# Patient Record
Sex: MALE | Race: ASIAN | NOT HISPANIC OR LATINO | URBAN - METROPOLITAN AREA
[De-identification: names, ages, dates, MRNs, and addresses within clinical notes are randomized per-mention and may not be internally consistent; named-entity substitution may affect disease eponyms.]

---

## 2017-05-23 ENCOUNTER — OFFICE VISIT - HEALTHEAST (OUTPATIENT)
Dept: FAMILY MEDICINE | Facility: CLINIC | Age: 42
End: 2017-05-23

## 2017-05-23 ENCOUNTER — RECORDS - HEALTHEAST (OUTPATIENT)
Dept: GENERAL RADIOLOGY | Facility: CLINIC | Age: 42
End: 2017-05-23

## 2017-05-23 DIAGNOSIS — R19.7 DIARRHEA: ICD-10-CM

## 2017-05-23 DIAGNOSIS — K59.00 CONSTIPATION: ICD-10-CM

## 2017-05-23 DIAGNOSIS — F17.200 TOBACCO DEPENDENCE: ICD-10-CM

## 2017-05-23 DIAGNOSIS — K21.9 ACID REFLUX: ICD-10-CM

## 2017-05-23 DIAGNOSIS — R19.7 DIARRHEA, UNSPECIFIED: ICD-10-CM

## 2017-05-23 ASSESSMENT — MIFFLIN-ST. JEOR: SCORE: 1605.31

## 2017-05-25 LAB
TTG IGA SER-ACNC: 0.2 U/ML
TTG IGG SER-ACNC: <0.6 U/ML

## 2017-05-30 ENCOUNTER — COMMUNICATION - HEALTHEAST (OUTPATIENT)
Dept: FAMILY MEDICINE | Facility: CLINIC | Age: 42
End: 2017-05-30

## 2017-05-30 DIAGNOSIS — R19.7 DIARRHEA: ICD-10-CM

## 2017-06-05 ENCOUNTER — RECORDS - HEALTHEAST (OUTPATIENT)
Dept: ADMINISTRATIVE | Facility: OTHER | Age: 42
End: 2017-06-05

## 2017-07-12 ENCOUNTER — RECORDS - HEALTHEAST (OUTPATIENT)
Dept: ADMINISTRATIVE | Facility: OTHER | Age: 42
End: 2017-07-12

## 2017-07-14 ENCOUNTER — RECORDS - HEALTHEAST (OUTPATIENT)
Dept: ADMINISTRATIVE | Facility: OTHER | Age: 42
End: 2017-07-14

## 2017-09-14 ENCOUNTER — COMMUNICATION - HEALTHEAST (OUTPATIENT)
Dept: FAMILY MEDICINE | Facility: CLINIC | Age: 42
End: 2017-09-14

## 2017-09-14 DIAGNOSIS — A04.8 HELICOBACTER PYLORI (H. PYLORI) INFECTION: ICD-10-CM

## 2017-09-14 DIAGNOSIS — K29.70 GASTRITIS: ICD-10-CM

## 2019-12-05 ENCOUNTER — OFFICE VISIT - HEALTHEAST (OUTPATIENT)
Dept: FAMILY MEDICINE | Facility: CLINIC | Age: 44
End: 2019-12-05

## 2019-12-05 DIAGNOSIS — L60.0 INGROWN TOENAIL: ICD-10-CM

## 2019-12-05 DIAGNOSIS — F17.200 TOBACCO DEPENDENCE SYNDROME: ICD-10-CM

## 2019-12-05 DIAGNOSIS — R40.0 DAYTIME SLEEPINESS: ICD-10-CM

## 2019-12-05 ASSESSMENT — MIFFLIN-ST. JEOR: SCORE: 1603.21

## 2019-12-23 ENCOUNTER — OFFICE VISIT - HEALTHEAST (OUTPATIENT)
Dept: FAMILY MEDICINE | Facility: CLINIC | Age: 44
End: 2019-12-23

## 2019-12-23 DIAGNOSIS — L60.0 INGROWING NAIL, LEFT GREAT TOE: ICD-10-CM

## 2019-12-23 DIAGNOSIS — K29.70 GASTRITIS: ICD-10-CM

## 2019-12-23 DIAGNOSIS — A04.8 HELICOBACTER PYLORI (H. PYLORI) INFECTION: ICD-10-CM

## 2019-12-23 RX ORDER — ACETAMINOPHEN 500 MG
1000 TABLET ORAL EVERY 6 HOURS PRN
Qty: 60 TABLET | Refills: 0 | Status: SHIPPED | OUTPATIENT
Start: 2019-12-23

## 2021-05-17 ENCOUNTER — RECORDS - HEALTHEAST (OUTPATIENT)
Dept: ADMINISTRATIVE | Facility: OTHER | Age: 46
End: 2021-05-17

## 2021-05-17 ASSESSMENT — MIFFLIN-ST. JEOR
SCORE: 1608.82
SCORE: 1585.23

## 2021-05-18 ENCOUNTER — COMMUNICATION - HEALTHEAST (OUTPATIENT)
Dept: SCHEDULING | Facility: CLINIC | Age: 46
End: 2021-05-18

## 2021-05-18 ENCOUNTER — SURGERY - HEALTHEAST (OUTPATIENT)
Dept: SURGERY | Facility: HOSPITAL | Age: 46
End: 2021-05-18

## 2021-05-18 ENCOUNTER — RECORDS - HEALTHEAST (OUTPATIENT)
Dept: ADMINISTRATIVE | Facility: OTHER | Age: 46
End: 2021-05-18

## 2021-05-18 ENCOUNTER — ANESTHESIA - HEALTHEAST (OUTPATIENT)
Dept: SURGERY | Facility: HOSPITAL | Age: 46
End: 2021-05-18

## 2021-05-18 ASSESSMENT — MIFFLIN-ST. JEOR: SCORE: 1581.15

## 2021-05-26 ENCOUNTER — RECORDS - HEALTHEAST (OUTPATIENT)
Dept: ADMINISTRATIVE | Facility: CLINIC | Age: 46
End: 2021-05-26

## 2021-05-27 VITALS — WEIGHT: 159.8 LBS | BODY MASS INDEX: 23.6 KG/M2 | BODY MASS INDEX: 24.37 KG/M2 | WEIGHT: 165 LBS

## 2021-05-27 VITALS — WEIGHT: 158.9 LBS | BODY MASS INDEX: 23.47 KG/M2

## 2021-05-27 VITALS — HEIGHT: 69 IN | HEIGHT: 69 IN

## 2021-05-30 ENCOUNTER — RECORDS - HEALTHEAST (OUTPATIENT)
Dept: ADMINISTRATIVE | Facility: CLINIC | Age: 46
End: 2021-05-30

## 2021-05-31 VITALS — HEIGHT: 69 IN | WEIGHT: 164 LBS | BODY MASS INDEX: 24.29 KG/M2

## 2021-06-04 VITALS
BODY MASS INDEX: 24.35 KG/M2 | SYSTOLIC BLOOD PRESSURE: 112 MMHG | HEIGHT: 69 IN | DIASTOLIC BLOOD PRESSURE: 76 MMHG | RESPIRATION RATE: 16 BRPM | HEART RATE: 72 BPM | WEIGHT: 164.4 LBS

## 2021-06-04 VITALS
WEIGHT: 164 LBS | HEART RATE: 88 BPM | DIASTOLIC BLOOD PRESSURE: 62 MMHG | SYSTOLIC BLOOD PRESSURE: 106 MMHG | BODY MASS INDEX: 24.57 KG/M2

## 2021-06-04 NOTE — PROGRESS NOTES
"Subjective:  44 y.o. male with concerns of left great toe pain.  About 1 year ago stubbed toe and lost nail.  Feels it did not grow back correctly.  Very sensitive area on the medial margin.  No erythema or fluctuance.  He request to have the lateral nail border removed.    Patient also experiencing difficulty with fatigue.  Feels energy and motivation are low.  Does not feel depressed.  Sex drive is low.  Feels that he could sleep 20 hours/day.  Does not feel restored after sleep.  His wife does note that he snores.  Unsure of breathing pauses.  Will frequently sleep from 8 or 9 PM until 6 AM.  Sometimes home from work for a nap between 3 and 6 PM.  Not falling asleep at stop lights.    Patient also requests help in quitting smoking.  Does not want to use gum or patches.  Wants a pill.    Outpatient Medications Prior to Visit   Medication Sig Dispense Refill     omeprazole (PRILOSEC) 40 MG capsule TAKE 1 CAPSULE(40 MG) BY MOUTH DAILY 90 capsule 1     No facility-administered medications prior to visit.       Social History     Tobacco Use   Smoking Status Current Every Day Smoker     Packs/day: 1.00     Years: 30.00     Pack years: 30.00     Types: Cigarettes   Smokeless Tobacco Current User   Tobacco Comment    1 pack per day.       Objective:  /76 (Patient Site: Right Arm, Patient Position: Sitting, Cuff Size: Adult Regular)   Pulse 72   Resp 16   Ht 5' 8.5\" (1.74 m)   Wt 164 lb 6.4 oz (74.6 kg)   BMI 24.63 kg/m    GENERAL: alert, not distressed  CHEST: clear, no rales, rhonchi, or wheezes  CARDIAC: regular without murmur, gallop, or rub  ABDOMEN: soft, non tender, non distended, normal bowel sounds  MS: Left great toe examined.  Does appear to have some incurvated medial border.  No erythema.  No edema.  No sign of fluctuance.    Assessment and Plan:   1. Daytime sleepiness  History fairly concerning for sleep apnea.  Discussed care at moments of importance such as driving.  Would check testosterone " and thyroid tests as well as CBC.  He wishes to do those upon return to clinic.  - Ambulatory referral to Sleep Medicine    2. Ingrown toenail  Discussed management options.  He requested nail matrix removal.  He wishes to reschedule for that.  An appointment was made.  Risks, benefits, and alternatives to nail matrix removal were explored.    3. Tobacco dependence syndrome  Risks, benefits, alternatives, and possible side effects of Chantix therapy discussed.  Follow package instructions and pick a quit date after up to a full dose.  He understands to discontinue if there are untoward side effects.  - varenicline (CHANTIX STARTING MONTH BOX) 0.5 mg (11)- 1 mg (42) tablet; 1 wk before you stop smoking take 0.5mg daily on days 1-3, 0.5mg 2 times each day on days 4-7, then 1mg 2 times daily.  Dispense: 53 tablet; Refill: 0  - varenicline (CHANTIX) 1 mg tablet; Take 1 tab by mouth two times a day. Take after eating with a full glass of water. NOTE: Dispense as maintenance for refills only.  Dispense: 60 tablet; Refill: 2

## 2021-06-04 NOTE — PROGRESS NOTES
Patient with hx of gastritis from H pyloir.  PPI helps. Has been treated for H pylori .  He requested a refill of PPI which was provided.  Avoid NSAIDs for pain (toe nail procedure today), so acetaminophen rx sent.

## 2021-06-04 NOTE — PROGRESS NOTES
Procedures   Partial Nail Avulsion   Medial Left great toe    Risks, benefits, alternatives, and possible side effects of procedure were reviewed with the patient.    He elected to proceed.    Toe cleansed with isopropyl alcohol and injection of 1% lidocaine performed to create a digital nerve block.  He had incomplete anesthesia with that and so a bit of lidocaine was again injected around the medial nail border.  This produced adequate anesthesia.  Toe was then prepped with iodine and sterilely draped.  Nail elevator was advanced below the medial edge of the nail.  An nail incision was then carried down to the nail matrix in a linear fashion.  The nail fragment was removed.  Phenol was applied.    Estimated blood loss was less than 20 mL.    Dressings applied.    Patient tolerated the procedure well.  There are no complications.    Aftercare instructions were provided.

## 2021-06-10 NOTE — PROGRESS NOTES
"Chief Complaint   Patient presents with     diarrhea since friday night with bad abdo cramps     no pcp.     smoking cessation     Subjective:  41 y.o. male with concerns of abdominal cramping.  Reports since Friday night he had diarrhea.  He has had very little to eat.  He describes strong stomach cramps.  He has had no vomiting and no fever.  Has historical constipation.  He has not traveled.  He has maintain hydration with water and Gatorade.  Denies having any blood in stool.  Has taken a large amount of Imodium.  Eating seems to worsen cramping.  Has no radiation to back.    Meds:  None     Allergies:  None    PMH  Noncontributory other than constipation and intolerance to dairy.    Family:  Not contributory.    ROS:  10 pt review positive for   Chills, sweats, fatigue, ear pain, weakness, joint pain, numb tinglings hands.    History   Smoking Status     Current Every Day Smoker     Packs/day: 1.00     Years: 30.00     Types: Cigarettes   Smokeless Tobacco     Not on file     Comment: 1 pack per day.       Objective:  /64 (Patient Site: Right Arm, Patient Position: Sitting, Cuff Size: Adult Regular)  Pulse 80  Temp 97.2  F (36.2  C) (Oral)   Ht 5' 8.75\" (1.746 m)  Wt 164 lb (74.4 kg)  BMI 24.4 kg/m2  GENERAL: alert, not distressed  EARS: normal tympanic membranes and external auditory canals bilaterally  PHARYNX: no erythema or exudates  MOUTH: well hydrated mucosa, no lesions  NECK: no lymphadenopathy or thyroid nodules  CHEST: clear, no rales, rhonchi, or wheezes  CARDIAC: regular without murmur  ABDOMEN: soft, non tender, non distended, normal bowel sounds    Abd xray:   Stool in colon and rectum.  Personally reviewed, today.    Recent Results (from the past 240 hour(s))   Basic Metabolic Panel   Result Value Ref Range    Sodium 140 136 - 145 mmol/L    Potassium 4.4 3.5 - 5.0 mmol/L    Chloride 103 98 - 107 mmol/L    CO2 24 22 - 31 mmol/L    Anion Gap, Calculation 13 5 - 18 mmol/L    Glucose 80 70 " "- 125 mg/dL    Calcium 9.3 8.5 - 10.5 mg/dL    BUN 12 8 - 22 mg/dL    Creatinine 0.96 0.70 - 1.30 mg/dL    GFR MDRD Af Amer >60 >60 mL/min/1.73m2    GFR MDRD Non Af Amer >60 >60 mL/min/1.73m2   Thyroid Stimulating Hormone (TSH)   Result Value Ref Range    TSH 0.96 0.30 - 5.00 uIU/mL   HM1 (CBC with Diff)   Result Value Ref Range    WBC 8.8 4.0 - 11.0 thou/uL    RBC 5.06 4.40 - 6.20 mill/uL    Hemoglobin 15.1 14.0 - 18.0 g/dL    Hematocrit 44.5 40.0 - 54.0 %    MCV 88 80 - 100 fL    MCH 30.0 27.0 - 34.0 pg    MCHC 34.0 32.0 - 36.0 g/dL    RDW 12.2 11.0 - 14.5 %    Platelets 255 140 - 440 thou/uL    MPV 7.8 7.0 - 10.0 fL    Neutrophils % 57 50 - 70 %    Lymphocytes % 29 20 - 40 %    Monocytes % 10 2 - 10 %    Eosinophils % 3 0 - 6 %    Basophils % 1 0 - 2 %    Neutrophils Absolute 5.0 2.0 - 7.7 thou/uL    Lymphocytes Absolute 2.5 0.8 - 4.4 thou/uL    Monocytes Absolute 0.9 0.0 - 0.9 thou/uL    Eosinophils Absolute 0.3 0.0 - 0.4 thou/uL    Basophils Absolute 0.1 0.0 - 0.2 thou/uL      Assessment and Plan:   1. Diarrhea and stomach cramping, suspect otilio  3. Constipation  Suspect rectal mass of stool with leakage of diarrhea.  Will attempt to relieve that with Fleets enema.  Stop all anti diarrheals.  Return to clinic if this does not resolve symptoms.  - sodium phosphates 133 mL (SODIUM PHOSPHATE) 19-7 gram/118 mL Enem rectal enema; Insert 1 enema into the rectum once for 1 dose.  Dispense: 1 mL; Refill: 1  - Tissue Transglutaminase,IgA & IgG    2. Acid reflux  Recommendations to decrease caffeine intake which is right now very significant.  Also decreased NSAID use which is occasional.  Check for H pylori antibody.  Consider proton pump inhibitor.  He is currently using a over-the-counter H2 blocker.  - H. pylori Antibody, IgG    4. Tobacco dependence  Interested in quitting \"Know I have to, but I enjoy smoking.\"  Discussed pharmacologic intervention.  Wants to try nicotine replacement 1st.  - nicotine (NICODERM CQ) 21 " mg/24 hr; Place 1 patch on the skin daily.  Dispense: 30 patch; Refill: 1

## 2021-06-17 NOTE — ANESTHESIA CARE TRANSFER NOTE
Last vitals:   Vitals:    05/18/21 1425   BP: 104/66   Pulse: 79   Resp: 13   Temp: 37.1  C (98.7  F)   SpO2: 98%   Phone report given to francy SARKAR, taking the patient on P2  Patient's level of consciousness is drowsy  Spontaneous respirations: yes  Maintains airway independently: yes  Dentition unchanged: yes  Oropharynx: oropharynx clear of all foreign objects    QCDR Measures:  ASA# 20 - Surgical Safety Checklist: WHO surgical safety checklist completed prior to induction    PQRS# 430 - Adult PONV Prevention: 4558F - Pt received => 2 anti-emetic agents (different classes) preop & intraop  ASA# 8 - Peds PONV Prevention: NA - Not pediatric patient, not GA or 2 or more risk factors NOT present  PQRS# 424 - Anitra-op Temp Management: 4559F - At least one body temp DOCUMENTED => 35.5C or 95.9F within required timeframe  PQRS# 426 - PACU Transfer Protocol: - Transfer of care checklist used  ASA# 14 - Acute Post-op Pain: ASA14B - Patient did NOT experience pain >= 7 out of 10

## 2021-06-17 NOTE — ANESTHESIA POSTPROCEDURE EVALUATION
Patient: Merlin Rodríguez  Procedure(s):  COLONOSCOPY  Anesthesia type: MAC    Patient location: Phase II Recovery  Last vitals:   Vitals Value Taken Time   /76 05/18/21 1541   Temp 36.6  C (97.9  F) 05/18/21 1541   Pulse 60 05/18/21 1541   Resp 18 05/18/21 1541   SpO2 97 % 05/18/21 1541     Post vital signs: stable  Level of consciousness: awake and responds to simple questions  Post-anesthesia pain: pain controlled  Post-anesthesia nausea and vomiting: no  Pulmonary: unassisted, return to baseline  Cardiovascular: stable and blood pressure at baseline  Hydration: adequate  Anesthetic events: no    QCDR Measures:  ASA# 11 - Anitra-op Cardiac Arrest: ASA11B - Patient did NOT experience unanticipated cardiac arrest  ASA# 12 - Anitra-op Mortality Rate: ASA12B - Patient did NOT die  ASA# 13 - PACU Re-Intubation Rate: ASA13B - Patient did NOT require a new airway mgmt  ASA# 10 - Composite Anes Safety: ASA10A - No serious adverse event    Additional Notes:

## 2021-06-17 NOTE — ANESTHESIA PREPROCEDURE EVALUATION
Anesthesia Evaluation      Patient summary reviewed     Airway   Mallampati: II  Neck ROM: full   Pulmonary - normal exam   (+) a smoker (0.5 ppd)                         Cardiovascular   Exercise tolerance: > or = 4 METS  Rhythm: regular  Rate: normal,         Neuro/Psych - negative ROS     Endo/Other - negative ROS      GI/Hepatic/Renal    (+) GERD,             Dental - normal exam                        Anesthesia Plan  Planned anesthetic: MAC    ASA 2   Induction: intravenous   Anesthetic plan and risks discussed with: patient and spouse  Anesthesia plan special considerations: antiemetics,   Post-op plan: routine recovery

## 2021-07-13 ENCOUNTER — HOSPITAL ENCOUNTER (EMERGENCY)
Facility: HOSPITAL | Age: 46
Discharge: HOME | End: 2021-07-13

## 2022-01-12 VITALS — BODY MASS INDEX: 23.54 KG/M2 | WEIGHT: 158.9 LBS | HEIGHT: 69 IN
